# Patient Record
Sex: FEMALE | Race: WHITE
[De-identification: names, ages, dates, MRNs, and addresses within clinical notes are randomized per-mention and may not be internally consistent; named-entity substitution may affect disease eponyms.]

---

## 2020-08-29 ENCOUNTER — HOSPITAL ENCOUNTER (EMERGENCY)
Dept: HOSPITAL 60 - LB.ED | Age: 20
Discharge: HOME | End: 2020-08-29
Payer: COMMERCIAL

## 2020-08-29 DIAGNOSIS — L50.9: Primary | ICD-10-CM

## 2020-08-29 PROCEDURE — 99282 EMERGENCY DEPT VISIT SF MDM: CPT

## 2020-08-29 NOTE — EDM.PDOC
ED HPI GENERAL MEDICAL PROBLEM





- General


Chief Complaint: Skin Complaint


Stated Complaint: Rash


Time Seen by Provider: 08/29/20 02:20


Source of Information: Reports: Patient


History Limitations: Reports: No Limitations





- History of Present Illness


INITIAL COMMENTS - FREE TEXT/NARRATIVE: 





Started with rash tonght. NOthing unusual for supper. Use dove soap. Rash 

appears to be hives


Onset: Today


Onset Date: 08/28/20


Onset Time: 20:00


Duration: Hour(s):


Location: Reports: Chest, Abdomen, Back, Upper Extremity, Left, Upper Extremity,

Right


Severity: Moderate


Improves with: Reports: None


Associated Symptoms: Reports: No Other Symptoms.  Denies: Chest Pain, Cough, 

Diaphoresis, Nausea/Vomiting, Shortness of Breath


Treatments PTA: Reports: Other (see below) (none)





Past Medical History





- Past Health History


Medical/Surgical History: Denies Medical/Surgical History





Social & Family History





- Tobacco Use


Smoking Status *Q: Never Smoker





- Alcohol Use


Days Per Week of Alcohol Use: 3


Number of Drinks Per Day: 4


Total Drinks Per Week: 12





- Recreational Drug Use


Recreational Drug Use: No





ED ROS GENERAL





- Review of Systems


Review Of Systems: See Below


Constitutional: Denies: Fever, Chills, Malaise


HEENT: Reports: Other (Swelling of eyelids)


Respiratory: Denies: Shortness of Breath, Wheezing


Cardiovascular: Denies: Chest Pain, Lightheadedness, Palpitations


GI/Abdominal: Denies: Abdominal Pain, Nausea, Vomiting





ED EXAM, SKIN/RASH


Exam: See Below


Exam Limited By: No Limitations


General Appearance: Alert, WD/WN, No Apparent Distress


Ears: Normal External Exam, Normal Canal, Normal TMs


Nose: Normal Inspection, Normal Mucosa


Throat/Mouth: Normal Inspection, Normal Lips, Normal Teeth, Normal Gums, Normal 

Oropharynx, No Airway Compromise


Head: Atraumatic, Normocephalic


Neck: Normal Inspection, Supple, Non-Tender, Full Range of Motion


Respiratory/Chest: No Respiratory Distress, Lungs Clear


GI/Abdominal: Normal Bowel Sounds, Soft, Non-Tender


Back Exam: Normal Inspection, Full Range of Motion


Extremities: Normal Range of Motion, Non-Tender


Neurological: Alert, Oriented, Normal Cognition, Normal Gait


Skin: Warm, Dry, Rash, Other (Urticarial rash to trunk and arms)


Characteristics: Maculopapular, Patchy, Urticarial


Associated features: No: Warmth, Swelling, Induration, Weeping





Course





- Vital Signs


Last Recorded V/S: 


                                Last Vital Signs











Temp  98.3 F   08/29/20 02:05


 


Pulse  63   08/29/20 02:05


 


Resp  16   08/29/20 02:05


 


BP  116/68   08/29/20 02:05


 


Pulse Ox  99   08/29/20 02:05














- Orders/Labs/Meds


Meds: 


Medications














Discontinued Medications














Generic Name Dose Route Start Last Admin





  Trade Name Freq  PRN Reason Stop Dose Admin


 


Diphenhydramine HCl  50 mg  08/29/20 02:17  08/29/20 02:18





  Benadryl  PO  08/29/20 02:18  50 mg





  ONETIME ONE   Administration


 


Prednisone  40 mg  08/29/20 02:24  08/29/20 02:28





  Prednisone  PO  08/29/20 02:25  40 mg





  ONETIME ONE   Administration














- Re-Assessments/Exams


Free Text/Narrative Re-Assessment/Exam: 





08/29/20 10:34


Patient treated with Benadryl and prednisone with mild improvement. NO SOB or 

throat swelling while in ED





Departure





- Departure


Time of Disposition: 02:40


Disposition: Home, Self-Care 01


Condition: Good


Clinical Impression: 


 Hives of unknown origin








- Discharge Information


*PRESCRIPTION DRUG MONITORING PROGRAM REVIEWED*: Not Applicable


*COPY OF PRESCRIPTION DRUG MONITORING REPORT IN PATIENT BREONNA: Not Applicable


Instructions:  Hives


Referrals: 


PCP,None [Primary Care Provider] - 


Forms:  ED Department Discharge


Additional Instructions: 


Return for increasing redness, swelling or tightness in throat, Shortness of 

breathing or worsening rash


Benadryl 25 mg  1 tablet every 4-6 hours as needed for redness or itching


Prednisone 10 mg tablets    4 tablets daily starting tomorrow afternoon for 3 

days


Follow up in clinic if rash recurs or return to ED as needed





Sepsis Event Note (ED)





- Evaluation


Sepsis Screening Result: No Definite Risk





- Focused Exam


Vital Signs: 


                                   Vital Signs











  Temp Pulse Resp BP Pulse Ox


 


 08/29/20 02:05  98.3 F  63  16  116/68  99